# Patient Record
Sex: MALE | Race: WHITE | NOT HISPANIC OR LATINO | URBAN - METROPOLITAN AREA
[De-identification: names, ages, dates, MRNs, and addresses within clinical notes are randomized per-mention and may not be internally consistent; named-entity substitution may affect disease eponyms.]

---

## 2019-09-22 ENCOUNTER — EMERGENCY (EMERGENCY)
Facility: HOSPITAL | Age: 54
LOS: 1 days | Discharge: HOME | End: 2019-09-22
Attending: EMERGENCY MEDICINE | Admitting: EMERGENCY MEDICINE
Payer: COMMERCIAL

## 2019-09-22 VITALS
OXYGEN SATURATION: 97 % | WEIGHT: 229.94 LBS | SYSTOLIC BLOOD PRESSURE: 140 MMHG | HEART RATE: 84 BPM | RESPIRATION RATE: 18 BRPM | DIASTOLIC BLOOD PRESSURE: 82 MMHG | TEMPERATURE: 98 F

## 2019-09-22 VITALS
HEART RATE: 81 BPM | OXYGEN SATURATION: 97 % | DIASTOLIC BLOOD PRESSURE: 81 MMHG | SYSTOLIC BLOOD PRESSURE: 137 MMHG | RESPIRATION RATE: 17 BRPM | TEMPERATURE: 98 F

## 2019-09-22 DIAGNOSIS — Z98.89 OTHER SPECIFIED POSTPROCEDURAL STATES: Chronic | ICD-10-CM

## 2019-09-22 PROCEDURE — 73590 X-RAY EXAM OF LOWER LEG: CPT | Mod: 26,RT

## 2019-09-22 PROCEDURE — 73502 X-RAY EXAM HIP UNI 2-3 VIEWS: CPT

## 2019-09-22 PROCEDURE — 72100 X-RAY EXAM L-S SPINE 2/3 VWS: CPT

## 2019-09-22 PROCEDURE — 99284 EMERGENCY DEPT VISIT MOD MDM: CPT

## 2019-09-22 PROCEDURE — 73610 X-RAY EXAM OF ANKLE: CPT

## 2019-09-22 PROCEDURE — 72100 X-RAY EXAM L-S SPINE 2/3 VWS: CPT | Mod: 26

## 2019-09-22 PROCEDURE — 73502 X-RAY EXAM HIP UNI 2-3 VIEWS: CPT | Mod: 26,RT

## 2019-09-22 PROCEDURE — 73590 X-RAY EXAM OF LOWER LEG: CPT

## 2019-09-22 PROCEDURE — 99284 EMERGENCY DEPT VISIT MOD MDM: CPT | Mod: 25

## 2019-09-22 PROCEDURE — 73610 X-RAY EXAM OF ANKLE: CPT | Mod: 26,RT

## 2019-09-22 NOTE — ED PROVIDER NOTE - MUSCULOSKELETAL, MLM
Mild tenderness to palpation right lower paraspinal area. Minimal pain upon external rotation right hip. Mild tenderness to palpation right tibial area, mid shaft and right lateral malleolus with intact N/V RLE.

## 2019-09-22 NOTE — ED PROVIDER NOTE - CARE PROVIDER_API CALL
Angelo Bhat)  Orthopaedic Surgery; Surgery of the Hand  [address] [place] [address]  Phone: [phone]  Fax: [phone]  Follow Up Time:

## 2019-09-22 NOTE — ED PROVIDER NOTE - PATIENT PORTAL LINK FT
You can access the FollowMyHealth Patient Portal offered by [facility] by registering at the following website: [email or web address]. By joining [employer]’s FollowMyHealth portal, you will also be able to view your health information using other applications (apps) compatible with our system.

## 2019-09-22 NOTE — ED PROVIDER NOTE - OBJECTIVE STATEMENT
53 yo white male, stepped into hole by accident that was covered by grass, fell into this deep hole and developed right lower back pain, sharp and non radiating with additional lower right leg and ankle pain. Pains all were increased with movement. No Bowel/Bladder compliant. No neck/chest or abdominal pains. Able to ambulate w/o assistance.

## 2019-09-22 NOTE — ED PROVIDER NOTE - CARE PLAN
Principal Discharge DX:	Lumbar contusion, initial encounter  Secondary Diagnosis:	Contusion of right lower leg, initial encounter

## 2019-09-22 NOTE — ED PROVIDER NOTE - CLINICAL SUMMARY MEDICAL DECISION MAKING FREE TEXT BOX
Low back and leg pain post fall into hole at [facility] requiring evaluation and x-rays.

## 2019-09-22 NOTE — ED ADULT NURSE NOTE - OBJECTIVE STATEMENT
received pt in bed in #t2 Pt A&O fell into 4ft hole Denies LOC c/o bilat hand pain, lower back pain & c/o right leg burning & right foot n/t.

## 2020-03-24 ENCOUNTER — EMERGENCY (EMERGENCY)
Facility: HOSPITAL | Age: 55
LOS: 1 days | Discharge: HOME | End: 2020-03-24
Attending: STUDENT IN AN ORGANIZED HEALTH CARE EDUCATION/TRAINING PROGRAM | Admitting: STUDENT IN AN ORGANIZED HEALTH CARE EDUCATION/TRAINING PROGRAM
Payer: COMMERCIAL

## 2020-03-24 VITALS
RESPIRATION RATE: 20 BRPM | OXYGEN SATURATION: 95 % | DIASTOLIC BLOOD PRESSURE: 85 MMHG | TEMPERATURE: 100 F | HEART RATE: 124 BPM | SYSTOLIC BLOOD PRESSURE: 150 MMHG

## 2020-03-24 DIAGNOSIS — Z98.89 OTHER SPECIFIED POSTPROCEDURAL STATES: Chronic | ICD-10-CM

## 2020-03-24 PROCEDURE — 93010 ELECTROCARDIOGRAM REPORT: CPT

## 2020-03-24 PROCEDURE — 99284 EMERGENCY DEPT VISIT MOD MDM: CPT

## 2020-03-24 RX ORDER — ACETAMINOPHEN 500 MG
650 TABLET ORAL ONCE
Refills: 0 | Status: COMPLETED | OUTPATIENT
Start: 2020-03-24 | End: 2020-03-24

## 2020-03-24 RX ORDER — SODIUM CHLORIDE 9 MG/ML
1000 INJECTION INTRAMUSCULAR; INTRAVENOUS; SUBCUTANEOUS ONCE
Refills: 0 | Status: COMPLETED | OUTPATIENT
Start: 2020-03-24 | End: 2020-03-24

## 2020-03-24 NOTE — ED ADULT NURSE NOTE - OBJECTIVE STATEMENT
Pt presents to ED with SOB. Pt states he was tested yesterday for COVID pending results,  has PNA on antibiotics, and SOB is worse at night. He states he has body aches, chills when having a fever highest 102.9, dry cough, headache. He vomited 2 days ago and had diarrhea.

## 2020-03-25 VITALS
SYSTOLIC BLOOD PRESSURE: 117 MMHG | TEMPERATURE: 98 F | OXYGEN SATURATION: 100 % | DIASTOLIC BLOOD PRESSURE: 75 MMHG | RESPIRATION RATE: 18 BRPM | HEART RATE: 99 BPM

## 2020-03-25 LAB
ALBUMIN SERPL ELPH-MCNC: 3.2 G/DL — LOW (ref 3.3–5)
ALP SERPL-CCNC: 94 U/L — SIGNIFICANT CHANGE UP (ref 40–120)
ALT FLD-CCNC: 54 U/L — SIGNIFICANT CHANGE UP (ref 12–78)
ANION GAP SERPL CALC-SCNC: 9 MMOL/L — SIGNIFICANT CHANGE UP (ref 5–17)
AST SERPL-CCNC: 36 U/L — SIGNIFICANT CHANGE UP (ref 15–37)
BASOPHILS # BLD AUTO: 0.01 K/UL — SIGNIFICANT CHANGE UP (ref 0–0.2)
BASOPHILS NFR BLD AUTO: 0.2 % — SIGNIFICANT CHANGE UP (ref 0–2)
BILIRUB SERPL-MCNC: 0.4 MG/DL — SIGNIFICANT CHANGE UP (ref 0.2–1.2)
BUN SERPL-MCNC: 10 MG/DL — SIGNIFICANT CHANGE UP (ref 7–23)
CALCIUM SERPL-MCNC: 8.9 MG/DL — SIGNIFICANT CHANGE UP (ref 8.5–10.1)
CHLORIDE SERPL-SCNC: 99 MMOL/L — SIGNIFICANT CHANGE UP (ref 96–108)
CO2 SERPL-SCNC: 28 MMOL/L — SIGNIFICANT CHANGE UP (ref 22–31)
CREAT SERPL-MCNC: 1.1 MG/DL — SIGNIFICANT CHANGE UP (ref 0.5–1.3)
EOSINOPHIL # BLD AUTO: 0 K/UL — SIGNIFICANT CHANGE UP (ref 0–0.5)
EOSINOPHIL NFR BLD AUTO: 0 % — SIGNIFICANT CHANGE UP (ref 0–6)
GLUCOSE SERPL-MCNC: 163 MG/DL — HIGH (ref 70–99)
HCT VFR BLD CALC: 40.7 % — SIGNIFICANT CHANGE UP (ref 39–50)
HGB BLD-MCNC: 13.5 G/DL — SIGNIFICANT CHANGE UP (ref 13–17)
IMM GRANULOCYTES NFR BLD AUTO: 0.6 % — SIGNIFICANT CHANGE UP (ref 0–1.5)
LACTATE SERPL-SCNC: 1.3 MMOL/L — SIGNIFICANT CHANGE UP (ref 0.7–2)
LYMPHOCYTES # BLD AUTO: 1.07 K/UL — SIGNIFICANT CHANGE UP (ref 1–3.3)
LYMPHOCYTES # BLD AUTO: 20 % — SIGNIFICANT CHANGE UP (ref 13–44)
MCHC RBC-ENTMCNC: 28.7 PG — SIGNIFICANT CHANGE UP (ref 27–34)
MCHC RBC-ENTMCNC: 33.2 GM/DL — SIGNIFICANT CHANGE UP (ref 32–36)
MCV RBC AUTO: 86.4 FL — SIGNIFICANT CHANGE UP (ref 80–100)
MONOCYTES # BLD AUTO: 0.33 K/UL — SIGNIFICANT CHANGE UP (ref 0–0.9)
MONOCYTES NFR BLD AUTO: 6.2 % — SIGNIFICANT CHANGE UP (ref 2–14)
NEUTROPHILS # BLD AUTO: 3.9 K/UL — SIGNIFICANT CHANGE UP (ref 1.8–7.4)
NEUTROPHILS NFR BLD AUTO: 73 % — SIGNIFICANT CHANGE UP (ref 43–77)
NRBC # BLD: 0 /100 WBCS — SIGNIFICANT CHANGE UP (ref 0–0)
PLATELET # BLD AUTO: 287 K/UL — SIGNIFICANT CHANGE UP (ref 150–400)
POTASSIUM SERPL-MCNC: 3.8 MMOL/L — SIGNIFICANT CHANGE UP (ref 3.5–5.3)
POTASSIUM SERPL-SCNC: 3.8 MMOL/L — SIGNIFICANT CHANGE UP (ref 3.5–5.3)
PROT SERPL-MCNC: 7.7 G/DL — SIGNIFICANT CHANGE UP (ref 6–8.3)
RBC # BLD: 4.71 M/UL — SIGNIFICANT CHANGE UP (ref 4.2–5.8)
RBC # FLD: 12.1 % — SIGNIFICANT CHANGE UP (ref 10.3–14.5)
SODIUM SERPL-SCNC: 136 MMOL/L — SIGNIFICANT CHANGE UP (ref 135–145)
WBC # BLD: 5.34 K/UL — SIGNIFICANT CHANGE UP (ref 3.8–10.5)
WBC # FLD AUTO: 5.34 K/UL — SIGNIFICANT CHANGE UP (ref 3.8–10.5)

## 2020-03-25 PROCEDURE — 99284 EMERGENCY DEPT VISIT MOD MDM: CPT | Mod: 25

## 2020-03-25 PROCEDURE — 83605 ASSAY OF LACTIC ACID: CPT

## 2020-03-25 PROCEDURE — 36415 COLL VENOUS BLD VENIPUNCTURE: CPT

## 2020-03-25 PROCEDURE — 96365 THER/PROPH/DIAG IV INF INIT: CPT

## 2020-03-25 PROCEDURE — 96361 HYDRATE IV INFUSION ADD-ON: CPT

## 2020-03-25 PROCEDURE — 85027 COMPLETE CBC AUTOMATED: CPT

## 2020-03-25 PROCEDURE — 93005 ELECTROCARDIOGRAM TRACING: CPT

## 2020-03-25 PROCEDURE — 71046 X-RAY EXAM CHEST 2 VIEWS: CPT

## 2020-03-25 PROCEDURE — 71046 X-RAY EXAM CHEST 2 VIEWS: CPT | Mod: 26

## 2020-03-25 PROCEDURE — 80053 COMPREHEN METABOLIC PANEL: CPT

## 2020-03-25 PROCEDURE — 87040 BLOOD CULTURE FOR BACTERIA: CPT

## 2020-03-25 RX ADMIN — Medication 200 MILLIGRAM(S): at 00:58

## 2020-03-25 RX ADMIN — SODIUM CHLORIDE 1000 MILLILITER(S): 9 INJECTION INTRAMUSCULAR; INTRAVENOUS; SUBCUTANEOUS at 01:41

## 2020-03-25 RX ADMIN — Medication 650 MILLIGRAM(S): at 00:41

## 2020-03-25 RX ADMIN — SODIUM CHLORIDE 1000 MILLILITER(S): 9 INJECTION INTRAMUSCULAR; INTRAVENOUS; SUBCUTANEOUS at 00:41

## 2020-03-25 RX ADMIN — Medication 650 MILLIGRAM(S): at 01:15

## 2020-03-25 NOTE — ED PROVIDER NOTE - NS_EDPROVIDERDISPOUSERTYPE_ED_A_ED
Attending Attestation (For Attendings USE Only)...
soft/no distention/nontender/bowel sounds normal/no masses palpable

## 2020-03-25 NOTE — ED PROVIDER NOTE - CARE PROVIDER_API CALL
Johnathan Tavera (DO)  Family Medicine  [address] [place] [address]  Phone: [phone]  Fax: [phone]  Follow Up Time:

## 2020-03-25 NOTE — ED PROVIDER NOTE - PATIENT PORTAL LINK FT
You can access the FollowMyHealth Patient Portal offered by [facility] by registering at the following website: [email or web address]. By joining [employer]’s FollowMyHealth portal, you will also be able to view your health information using other applications (apps) compatible with our system.

## 2020-03-25 NOTE — ED PROVIDER NOTE - NSFOLLOWUPINSTRUCTIONS_ED_ALL_ED_FT
Please be sure to follow up with your primary care doctor and take the medication as prescribed.  Return to the ER for persistent fever, shortness of breath, respiratory distress, or any other concerns. Be sure to self-isolate for 14 days.

## 2020-03-25 NOTE — ED PROVIDER NOTE - CLINICAL SUMMARY MEDICAL DECISION MAKING FREE TEXT BOX
54 year old male with cough, fever, and malaise x 1 week, sent at  yesterday- dx with PNA and had COVID test sent.  Patient looks comfortable, no respiratory distress.  Labs, cultures, lactate, CXR, treat for PNA.  Likely discharge.

## 2020-03-25 NOTE — ED PROVIDER NOTE - PROGRESS NOTE DETAILS
Results of work up explained to patient and copies of all reports given.  Will continue treatment for PNA that was started yesterday at .  COVID test pending.  Advised to self-isolate for 14 days from his family and understands to return to the ER for any concerns, persistent symptoms, respiratory distress.  He is requesting cough medication, states it was not sent to pharmacy from  yesterday.

## 2020-03-25 NOTE — ED PROVIDER NOTE - OBJECTIVE STATEMENT
54 year old male with a history of HLD, DM, HTN presents with fever x 1 week. Tmax 102.9, he has been treating with Tylenol 500mg Q 5 hours. The symptoms initially started with generalized weakness and malaise.   He developed a cough yesterday and went to Urgent Care.  He had a CXR and was diagnosed with bronchitis.  Discharged with Levaquin 500mg. Bromfed DM, and Albuterol.  He has been taking the medication for 2 days without relief.  The fever subsided last night which was when he took his last dose of Tylenol. 54 year old male with a history of HLD, DM, HTN presents with fever x 1 week. Tmax 102.9, he has been treating with Tylenol 500mg Q 5 hours. The symptoms initially started with generalized weakness and malaise.   He developed a dry cough yesterday and went to Urgent Care.  He had a CXR and was diagnosed with bronchitis.  Discharged with Levaquin 500mg. Bromfed DM, and Albuterol.  He has been taking the medication for 2 days without relief.  He returned to urgent care again yesterday because he noted persistent fevers despite taking Tylenol.   He had a COVID and flu test at , flu was negative and he was told COVID will be resulted in 2 days.  No sick contacts, no recent foreign travel.  The fever subsided last night which was when he took his last dose of Tylenol.  Tonight he felt short of breath prior to sleeping so he came to the ED.  He has sleep apnea.  Denies chest pain, headache.   PMD Dr. Tavera 54 year old male with a history of HLD, DM, HTN presents with fever x 1 week. Tmax 102.9, he has been treating with Tylenol 500mg Q 5 hours. The symptoms initially started with generalized weakness and malaise.   He developed a dry cough yesterday and went to Urgent Care.  He had a CXR and was diagnosed with bronchitis/PNA.  Discharged with Levaquin 500mg. Bromfed DM, and Albuterol.  He has been taking the medication for 2 days without relief.  He returned to urgent care again yesterday because he noted persistent fevers despite taking Tylenol.   He had a COVID and flu test at , flu was negative and he was told COVID will be resulted in 2 days.  No sick contacts, no recent foreign travel.  The fever subsided last night which was when he took his last dose of Tylenol.  Tonight he felt short of breath prior to sleeping so he came to the ED.  He has sleep apnea.  Denies chest pain, headache.   PMD Dr. Tavera

## 2020-03-30 LAB
CULTURE RESULTS: SIGNIFICANT CHANGE UP
CULTURE RESULTS: SIGNIFICANT CHANGE UP
SPECIMEN SOURCE: SIGNIFICANT CHANGE UP
SPECIMEN SOURCE: SIGNIFICANT CHANGE UP

## 2020-09-12 ENCOUNTER — TRANSCRIPTION ENCOUNTER (OUTPATIENT)
Age: 55
End: 2020-09-12

## 2020-11-02 NOTE — ED ADULT NURSE NOTE - NSSEPSISSUSPECTED_ED_A_ED
Letter mailed to pt with results.    Karla Oh repeat colonoscopy in 10 year(s).  Checklist:  Surgical history updated.  Colonoscopy tracking completed.  Episode resolved.   Reminder entered.        No

## 2021-03-24 NOTE — ED ADULT NURSE NOTE - PMH
Patient is aware and has not tried anything OTC. She said she will try other avenues.    Diabetes    Hyperlipidemia    Kidney stone  left

## 2021-06-16 ENCOUNTER — APPOINTMENT (OUTPATIENT)
Dept: ELECTROPHYSIOLOGY | Facility: CLINIC | Age: 56
End: 2021-06-16
Payer: COMMERCIAL

## 2021-06-16 ENCOUNTER — TRANSCRIPTION ENCOUNTER (OUTPATIENT)
Age: 56
End: 2021-06-16

## 2021-06-16 VITALS
WEIGHT: 222 LBS | DIASTOLIC BLOOD PRESSURE: 76 MMHG | SYSTOLIC BLOOD PRESSURE: 138 MMHG | HEIGHT: 68 IN | HEART RATE: 73 BPM | BODY MASS INDEX: 33.65 KG/M2

## 2021-06-16 DIAGNOSIS — I48.91 UNSPECIFIED ATRIAL FIBRILLATION: ICD-10-CM

## 2021-06-16 PROCEDURE — 99244 OFF/OP CNSLTJ NEW/EST MOD 40: CPT

## 2021-06-16 PROCEDURE — 93000 ELECTROCARDIOGRAM COMPLETE: CPT

## 2021-06-20 ENCOUNTER — FORM ENCOUNTER (OUTPATIENT)
Age: 56
End: 2021-06-20

## 2021-06-21 ENCOUNTER — TRANSCRIPTION ENCOUNTER (OUTPATIENT)
Age: 56
End: 2021-06-21

## 2021-06-21 ENCOUNTER — OUTPATIENT (OUTPATIENT)
Dept: OUTPATIENT SERVICES | Facility: HOSPITAL | Age: 56
LOS: 1 days | End: 2021-06-21
Payer: COMMERCIAL

## 2021-06-21 VITALS
WEIGHT: 222.01 LBS | OXYGEN SATURATION: 100 % | TEMPERATURE: 98 F | RESPIRATION RATE: 18 BRPM | SYSTOLIC BLOOD PRESSURE: 120 MMHG | HEART RATE: 77 BPM | HEIGHT: 68 IN | DIASTOLIC BLOOD PRESSURE: 71 MMHG

## 2021-06-21 DIAGNOSIS — I48.91 UNSPECIFIED ATRIAL FIBRILLATION: ICD-10-CM

## 2021-06-21 DIAGNOSIS — Z98.89 OTHER SPECIFIED POSTPROCEDURAL STATES: Chronic | ICD-10-CM

## 2021-06-21 LAB
ANION GAP SERPL CALC-SCNC: 11 MMOL/L — SIGNIFICANT CHANGE UP (ref 5–17)
BUN SERPL-MCNC: 16.6 MG/DL — SIGNIFICANT CHANGE UP (ref 8–20)
CALCIUM SERPL-MCNC: 9.3 MG/DL — SIGNIFICANT CHANGE UP (ref 8.6–10.2)
CHLORIDE SERPL-SCNC: 101 MMOL/L — SIGNIFICANT CHANGE UP (ref 98–107)
CO2 SERPL-SCNC: 27 MMOL/L — SIGNIFICANT CHANGE UP (ref 22–29)
CREAT SERPL-MCNC: 0.98 MG/DL — SIGNIFICANT CHANGE UP (ref 0.5–1.3)
GLUCOSE SERPL-MCNC: 107 MG/DL — HIGH (ref 70–99)
HCT VFR BLD CALC: 47.7 % — SIGNIFICANT CHANGE UP (ref 39–50)
HGB BLD-MCNC: 15.1 G/DL — SIGNIFICANT CHANGE UP (ref 13–17)
MAGNESIUM SERPL-MCNC: 1.8 MG/DL — SIGNIFICANT CHANGE UP (ref 1.6–2.6)
MCHC RBC-ENTMCNC: 29.4 PG — SIGNIFICANT CHANGE UP (ref 27–34)
MCHC RBC-ENTMCNC: 31.7 GM/DL — LOW (ref 32–36)
MCV RBC AUTO: 92.8 FL — SIGNIFICANT CHANGE UP (ref 80–100)
PLATELET # BLD AUTO: 370 K/UL — SIGNIFICANT CHANGE UP (ref 150–400)
POTASSIUM SERPL-MCNC: 4.3 MMOL/L — SIGNIFICANT CHANGE UP (ref 3.5–5.3)
POTASSIUM SERPL-SCNC: 4.3 MMOL/L — SIGNIFICANT CHANGE UP (ref 3.5–5.3)
RBC # BLD: 5.14 M/UL — SIGNIFICANT CHANGE UP (ref 4.2–5.8)
RBC # FLD: 12.8 % — SIGNIFICANT CHANGE UP (ref 10.3–14.5)
SODIUM SERPL-SCNC: 139 MMOL/L — SIGNIFICANT CHANGE UP (ref 135–145)
WBC # BLD: 9.76 K/UL — SIGNIFICANT CHANGE UP (ref 3.8–10.5)
WBC # FLD AUTO: 9.76 K/UL — SIGNIFICANT CHANGE UP (ref 3.8–10.5)

## 2021-06-21 PROCEDURE — 93325 DOPPLER ECHO COLOR FLOW MAPG: CPT

## 2021-06-21 PROCEDURE — 93312 ECHO TRANSESOPHAGEAL: CPT | Mod: 26

## 2021-06-21 PROCEDURE — 83735 ASSAY OF MAGNESIUM: CPT

## 2021-06-21 PROCEDURE — 93312 ECHO TRANSESOPHAGEAL: CPT

## 2021-06-21 PROCEDURE — 36415 COLL VENOUS BLD VENIPUNCTURE: CPT

## 2021-06-21 PROCEDURE — 93320 DOPPLER ECHO COMPLETE: CPT

## 2021-06-21 PROCEDURE — 80048 BASIC METABOLIC PNL TOTAL CA: CPT

## 2021-06-21 PROCEDURE — 93005 ELECTROCARDIOGRAM TRACING: CPT

## 2021-06-21 PROCEDURE — 93325 DOPPLER ECHO COLOR FLOW MAPG: CPT | Mod: 26

## 2021-06-21 PROCEDURE — 93010 ELECTROCARDIOGRAM REPORT: CPT | Mod: 76

## 2021-06-21 PROCEDURE — 92960 CARDIOVERSION ELECTRIC EXT: CPT

## 2021-06-21 PROCEDURE — 93320 DOPPLER ECHO COMPLETE: CPT | Mod: 26

## 2021-06-21 PROCEDURE — 85027 COMPLETE CBC AUTOMATED: CPT

## 2021-06-21 RX ORDER — RIVAROXABAN 15 MG-20MG
1 KIT ORAL
Qty: 0 | Refills: 0 | DISCHARGE

## 2021-06-21 RX ORDER — DILTIAZEM HCL 120 MG
1 CAPSULE, EXT RELEASE 24 HR ORAL
Qty: 30 | Refills: 2
Start: 2021-06-21 | End: 2021-09-18

## 2021-06-21 RX ORDER — MULTIVIT-MIN/FERROUS GLUCONATE 9 MG/15 ML
1 LIQUID (ML) ORAL
Qty: 0 | Refills: 0 | DISCHARGE

## 2021-06-21 RX ORDER — RIVAROXABAN 15 MG-20MG
1 KIT ORAL
Qty: 30 | Refills: 2
Start: 2021-06-21 | End: 2021-09-18

## 2021-06-21 RX ORDER — DIGOXIN 250 MCG
1 TABLET ORAL
Qty: 0 | Refills: 0 | DISCHARGE

## 2021-06-21 RX ORDER — HALOBETASOL PROPIONATE 0.5 MG/G
1 OINTMENT TOPICAL
Qty: 0 | Refills: 0 | DISCHARGE

## 2021-06-21 RX ORDER — VALACYCLOVIR 500 MG/1
0 TABLET, FILM COATED ORAL
Qty: 0 | Refills: 0 | DISCHARGE

## 2021-06-21 RX ORDER — DILTIAZEM HCL 120 MG
1 CAPSULE, EXT RELEASE 24 HR ORAL
Qty: 0 | Refills: 0 | DISCHARGE

## 2021-06-21 RX ORDER — ERGOCALCIFEROL 1.25 MG/1
0 CAPSULE ORAL
Qty: 0 | Refills: 0 | DISCHARGE

## 2021-06-21 RX ORDER — CLINDAMYCIN PHOSPHATE GEL USP, 1% 10 MG/G
1 GEL TOPICAL
Qty: 0 | Refills: 0 | DISCHARGE

## 2021-06-21 RX ORDER — MAGNESIUM SULFATE 500 MG/ML
2 VIAL (ML) INJECTION ONCE
Refills: 0 | Status: COMPLETED | OUTPATIENT
Start: 2021-06-21 | End: 2021-06-21

## 2021-06-21 RX ORDER — EMPAGLIFLOZIN 10 MG/1
1 TABLET, FILM COATED ORAL
Qty: 0 | Refills: 0 | DISCHARGE

## 2021-06-21 RX ORDER — CICLOPIROX OLAMINE 7.7 MG/G
1 CREAM TOPICAL
Qty: 0 | Refills: 0 | DISCHARGE

## 2021-06-21 RX ADMIN — Medication 50 GRAM(S): at 13:08

## 2021-06-21 NOTE — DISCHARGE NOTE PROVIDER - CARE PROVIDERS DIRECT ADDRESSES
[email or web address]

## 2021-06-21 NOTE — H&P PST ADULT - COMMENTS
denies recent cough, fever, chills, dysuria, hematuria, recent travel or COVID 19 infection  COVID Vaccine denies recent cough, fever, chills, dysuria, hematuria, recent travel or COVID 19 infection  COVID Vaccine: Pfizer completed 3/2021

## 2021-06-21 NOTE — DISCHARGE NOTE PROVIDER - NSDCMRMEDTOKEN_GEN_ALL_CORE_FT
atorvastatin 20 mg oral tablet: 1 tab(s) orally once a day (at bedtime)  Centrum Silver Men&#x27;s oral tablet: 1 tab(s) orally once a day  clindamycin 1% topical gel: Apply topically to affected area 2 times a day  DilTIAZem (Eqv-Cardizem CD) 240 mg/24 hours oral capsule, extended release: 1 cap(s) orally once a day  ergocalciferol 50,000 intl units (1.25 mg) oral tablet: orally once a week  halobetasol 0.05% topical ointment: Apply topically to affected area 2 times a day  Jardiance 10 mg oral tablet: 1 tab(s) orally once a day (in the morning)  Loprox 0.77% topical cream: Apply topically to affected area 2 times a day  metFORMIN 1000 mg oral tablet: 1 tab(s) orally 2 times a day  rivaroxaban 20 mg oral tablet: 1 tab(s) orally once a day (in the evening)  valACYclovir 1 g oral tablet: orally once a day (at bedtime)

## 2021-06-21 NOTE — DISCHARGE NOTE PROVIDER - HOSPITAL COURSE
55 obese male with pmhx DM, HLD, LBBB, cervical spinal herniations and newly diagnosed (5/30/21), symptomatic, persistent atrial fibrillation who presented today for and is now status post KEN negative for CARISSA thrombus and uncomplicated DCCV with restoration of sinus rhythm.

## 2021-06-21 NOTE — PROGRESS NOTE ADULT - SUBJECTIVE AND OBJECTIVE BOX
Pt doing well s/p uncomplicated KEN & DCCV 300J x 1, currently in SR. Denies complaint.   Prelim KEN pre Dr Ryan: nml LVFx, negative for intracardiac thrombus    ECG:     Exam:   VSS, NAD, A&O x 3  Skin: no erythema/edema/blistering at defib pad sites.   Card: S1/S2, RRR, no m/g/r  Resp: lungs CTA b/l  Abd: S/NT/ND  Ext: no edema, distal pulses intact    Assessment: 55 obese male with pmhx DM, HLD, LBBB, cervical spinal herniations and newly diagnosed (5/30/21), symptomatic, persistent atrial fibrillation who presented today for and is now status post KEN negative for CARISSA thrombus and uncomplicated DCCV with restoration of sinus rhythm.     Plan:   Observation on telemetry per post op protocol.    Resume PO intake.   Ambulate w/ assist once fully awake & back to baseline mental status w/ VSS.  Continue Xarelto 20mg nightly. Importance of strict compliance with anticoagulation regimen reinforced with pt.   Discontinue digoxin  Resume other home medications.   Anticipate d/c home once all criteria met, with outpt f/up in 1 month, sooner if needed.   Pt doing well s/p uncomplicated KEN & DCCV 300J x 1, currently in SR. Denies complaint.   Prelim KEN pre Dr Ryan: nml LVFx, negative for intracardiac thrombus    ECG: sinus rhythm 81bpm, LAD, LBBB    Exam:   VSS, NAD, A&O x 3  Skin: no erythema/edema/blistering at defib pad sites.   Card: S1/S2, RRR, no m/g/r  Resp: lungs CTA b/l  Abd: S/NT/ND  Ext: no edema, distal pulses intact    Assessment: 55 obese male with pmhx DM, HLD, LBBB, cervical spinal herniations and newly diagnosed (5/30/21), symptomatic, persistent atrial fibrillation who presented today for and is now status post KEN negative for CARISSA thrombus and uncomplicated DCCV with restoration of sinus rhythm.     Plan:   Observation on telemetry per post op protocol.    Resume PO intake.   Ambulate w/ assist once fully awake & back to baseline mental status w/ VSS.  Continue Xarelto 20mg nightly. Importance of strict compliance with anticoagulation regimen reinforced with pt.   Discontinue digoxin  Resume other home medications.   Anticipate d/c home once all criteria met, with outpt f/up in 1 month, sooner if needed.  Weight loss and sleep apnea work up encouraged    Pt doing well s/p uncomplicated KEN & DCCV 300J x 1, currently in SR. Denies complaint.   Prelim KEN pre Dr Ryan: nml LVFx, negative for intracardiac thrombus    ECG: sinus rhythm 81bpm, LAD, LBBB    Exam:   VSS, NAD, A&O x 3  Skin: no erythema/edema/blistering at defib pad sites.   Card: S1/S2, RRR, no m/g/r  Resp: lungs CTA b/l  Abd: S/NT/ND  Ext: no edema, distal pulses intact    Assessment: 55 obese male with pmhx DM, HLD, LBBB, cervical spinal herniations and newly diagnosed (5/30/21), symptomatic, persistent atrial fibrillation who presented today for and is now status post KEN negative for CARISSA thrombus and uncomplicated DCCV with restoration of sinus rhythm.     Plan:   Observation on telemetry per post op protocol.    Resume PO intake.   Ambulate w/ assist once fully awake & back to baseline mental status w/ VSS.  Continue Xarelto 20mg nightly. Importance of strict compliance with anticoagulation regimen reinforced with pt.   Discontinue digoxin  Resume other home medications.   Magnesium supplemented  Anticipate d/c home once all criteria met, with outpt f/up in 1 month, sooner if needed.  Weight loss and sleep apnea work up encouraged

## 2021-06-21 NOTE — H&P PST ADULT - NSICDXPASTMEDICALHX_GEN_ALL_CORE_FT
PAST MEDICAL HISTORY:  Atrial fibrillation     Diabetes     Hyperlipidemia     Kidney stone left    Left bundle branch block (LBBB)

## 2021-06-21 NOTE — H&P PST ADULT - ASSESSMENT
55 male with pmhx DM, HLD, LBBB, cervical spinal herniations and persistent atrial fibrillation who presents for PST, KEN & DCCV.     - IVL, labs, ECG  - confirmed npo > 10 hours  - last dose of xarelto ........... 55 male with pmhx DM, HLD, LBBB, cervical spinal herniations and newly diagnosed (5/30/21), symptomatic, persistent atrial fibrillation who presents for PST, KEN & DCCV.     - IVL, labs, ECG  - confirmed npo > 10 hours  - last dose of xarelto this morning 6/21/21  - pt has referral for sleep study but is not scheduled yet 55 male with pmhx DM, HLD, LBBB, cervical spinal herniations and newly diagnosed (5/30/21), symptomatic, persistent atrial fibrillation who presents for PST, KEN & DCCV.     - IVL, labs, ECG  - confirmed npo > 10 hours  - last dose of xarelto was last night 6/20/21  - pt has referral for sleep study but is not scheduled yet

## 2021-06-21 NOTE — DISCHARGE NOTE PROVIDER - CARE PROVIDER_API CALL
Juan Diego Mcgill)  Cardiology; Internal Medicine  [address], [address]  [place] [address]  Phone: [phone]  Fax: [phone]  Follow Up Time:     Jerry Gonzales)  Cardiovascular Disease; Internal Medicine  [facility], [address], [address]  [place] [address]  Phone: [phone]  Fax: [phone]  Follow Up Time:

## 2021-06-21 NOTE — H&P PST ADULT - HISTORY OF PRESENT ILLNESS
55 male with pmhx DM, HLD, LBBB, cervical spinal herniations and persistent atrial fibrillation who presents for PST, KEN & DCCV.   Newly diagnosed atrial fibrillation                 , pt denies prior DCCV, ablation of AAD.    Stress Test 10/24/2017: EF 73%, negative for ischemia, negative for infarct  TTE 2016: nml LVFx 55 male with pmhx DM, HLD, LBBB, cervical spinal herniations and persistent atrial fibrillation who presents for PST, KEN & DCCV.   Newly diagnosed atrial fibrillation 5/30/21, pt denies prior DCCV, ablation of AAD.  Pt awaiting sleep study.    Stress Test 10/24/2017: EF 73%, negative for ischemia, negative for infarct  TTE 2016: nml LVFx

## 2021-06-21 NOTE — DISCHARGE NOTE NURSING/CASE MANAGEMENT/SOCIAL WORK - PATIENT PORTAL LINK FT
You can access the FollowMyHealth Patient Portal offered by [facility] by registering at the following website: [email or web address]. By joining [employer]’s FollowMyHealth portal, you will also be able to view your health information using other applications (apps) compatible with our system.

## 2021-06-21 NOTE — DISCHARGE NOTE PROVIDER - NSDCCPTREATMENT_GEN_ALL_CORE_FT
PRINCIPAL PROCEDURE  Procedure: Cardioversion, with KEN if indicated  Findings and Treatment: Please schedule a 2-4 week follow up with Dr Mcgill at [facility] [place], sooner if needed.  -Take each dose of your anticoagulation medication (blood thinner) exactly as prescribed.   -Resume your diet with soft foods first.  - Do not drive, operate heavy machinery or make important decisions for 24 hours following the procedure.  - You may resume all other activities the day after the procedure.  Call your doctor if:   -you develop a fever, cough up blood, have any chest pain, palpitations or difficulty breathing. You may take a throat lozenge if you develop a sore throat or try warm salt water gargle.   - you have any questions or concerns regarding the procedure.  If you experience increased difficulty breathing or chest pain, or if you faint, have dizzy spells, or for any other distressing symptom, please seek immediate medical attention.

## 2021-06-22 NOTE — ED ADULT NURSE NOTE - NSFALLRSKASSESSTYPE_ED_ALL_ED
PDMP reviewed; no aberrant behavior identified,  If OK to refill, please sign.   Initial (On Arrival)

## 2021-07-23 ENCOUNTER — TRANSCRIPTION ENCOUNTER (OUTPATIENT)
Age: 56
End: 2021-07-23

## 2021-07-26 NOTE — CARDIOLOGY SUMMARY
[de-identified] : 6/16/21 atrial fibrillation 73 bpm, LBBB ( ms) [de-identified] : Stress 10/24/17 normal nuclear stress, diaphragmatic attenuation, LVEF 73%  [de-identified] : TTE 2016 normal LV function

## 2021-07-26 NOTE — HISTORY OF PRESENT ILLNESS
[FreeTextEntry1] : 55 year old gentleman with history of DM, HLD, LBBB, cervical spinal herniations, presenting for evaluation of atrial fibrillation.  \par \par Last month he presented with mild chest pain, which occurred the day after drinking vodka and expresso. He went to his PMD and was found to be in AF with rapid rates. He was then hospitalized at [facility] for atrial fibrillation, and treated with rate control and diuresis for associated CHF symptoms. He subsequntly felt better, and was discharged on anticoagulation (Xarelto, and ASA was continued) and rate control with diltiazem 240mg qd and digoxin.   \par \par On followup, he has remained in atrial fibrillation. He feels fatigued, and is more exhausted after mild activity, but otherwise feels generally well. He denies palpitation, syncope, or dyspnea at this time.  \par \par ECG today reveals AF with average rate 73 bpm, and LBBB.  \par \par Prior TTE and stress were normal. Pt reports he had a TTE in the hospital which was also normal.  \par \par Of note, he does snore, and has not had prior sleep evaluation. \par \par   \par \par Current meds include ASA 81, Xarelto 20mg qd, digoxin 250mcg qd, diltiazem 240mg qd \par \par

## 2021-07-26 NOTE — REVIEW OF SYSTEMS
Procedure   [facility]  Cardiology Progress Note    Patient Identification:  Name:Casey Snowden  Age:83 y.o.  Sex: male  :  1933  MRN: [id number]           2017    Subjective:    Chief Complaint   Patient presents with   • Atrial Fibrillation       HPI     Hospital Course 2016: Casey Snowden Is a 83-year-old male with past medical history including DVT, Parkinson's disease, and previous right knee replacement. He began having issues with his right knee about 3-4 weeks ago after a trauma from a fall to the knee. It has had some swelling both of the knee as well as behind the knee. He saw urgent care as well as his vascular surgeon Dr. NAVARRO. Additional issues developed over the past week, especially the past 2 or 3 days. He had fevers, chills increasing drowsiness, and even drainage from the area. He presented to [facility] emergency room x-rays were negative he was started on IV vancomycin. Feels about the same.  The patient was admitted to the hospital with the infected hematoma in the right leg. He also had some paroxysmal atrial fibrillation with a rapid response requiring Cardizem drip. He was seen by orthopedic surgery, cardiology and infectious disease. He had I&D of the infected hematoma which grew methicillin sensitive staph and was placed on multi for his paroxysmal A. fib. After the I&D the patient was restarted on Eliquis and felt well enough to go home. He'll continue with some oral antibiotics for another week and follow-up with his doctors in the next week or 2 as listed for follow-up appointments.    Since the discharge from hospital he is doing good. Remains in sinus rhythm.  He completed his Multaq and remains in sinus rhythm and he wants to stay off Multaq. He is on Eliquis. He denies any chest pain.       Review of Systems   Constitution: Negative for chills, fever and malaise/fatigue.   HENT: Negative for headaches.    Eyes: Negative for blurred  vision and double vision.   Cardiovascular: Positive for leg swelling (better). Negative for chest pain, irregular heartbeat and palpitations.   Respiratory: Positive for snoring. Negative for shortness of breath.    Skin: Negative for color change.   Musculoskeletal: Negative for arthritis and joint pain.   Gastrointestinal: Negative for abdominal pain, nausea and vomiting.   Neurological: Negative for dizziness, light-headedness and numbness.   Psychiatric/Behavioral: Negative for depression.       Past Medical History   Diagnosis Date   • Carotid stenosis    • Chronic deep vein thrombosis (DVT) of popliteal vein of right lower extremity    • DVT of lower extremity (deep venous thrombosis)    • Long-term use of high-risk medication    • Parkinson's disease    • Postphlebitic syndrome        Past Surgical History   Procedure Laterality Date   • Vena cava filter placement     • Total knee arthroplasty Right    • Knee incision and drainage Right 12/27/2016     Procedure: KNEE INCISION AND DRAINAGE;  Surgeon: Triston Whitten MD;  Location: [facility];  Service:        Social History     Social History   • Marital status:      Spouse name: N/A   • Number of children: N/A   • Years of education: college grad     Occupational History   • financial advis      self employed     Social History Main Topics   • Smoking status: Former Smoker     Types: Cigarettes   • Smokeless tobacco: Never Used   • Alcohol use 1.2 oz/week     2 Glasses of wine per week   • Drug use: No   • Sexual activity: Yes     Partners: Female     Other Topics Concern   • Not on file     Social History Narrative       Family History   Problem Relation Age of Onset   • Heart attack Mother    • Stroke Brother    • Heart attack Maternal Aunt    • Heart attack Maternal Uncle    • Deep vein thrombosis Neg Hx        Scheduled Meds:    Current Outpatient Prescriptions:   •  atorvastatin (LIPITOR) 20 MG tablet, TAKE 1 TABLET DAILY, Disp: 90 tablet,  Rfl: 3  •  atropine 1 % ophthalmic solution, Apply 1 drop to eye 3 (Three) Times a Day., Disp: , Rfl:   •  carbidopa-levodopa (SINEMET)  MG per tablet, 1 tablet 3 (Three) Times a Day., Disp: , Rfl:   •  carboxymethylcellulose (REFRESH PLUS) 0.5 % solution, 1 drop 3 (Three) Times a Day As Needed for dry eyes., Disp: , Rfl:   •  ELIQUIS 2.5 MG tablet tablet, 2.5 mg Every 12 (Twelve) Hours., Disp: , Rfl:   •  Multiple Vitamins-Minerals (PRESERVISION AREDS) capsule, Take  by mouth., Disp: , Rfl:     Objective:  Visit Vitals   • /95   • Pulse 50   • Wt 189 lb (85.7 kg)   • BMI 26.36 kg/m2        Physical Exam  Physical Exam:    General: No acute distress.    Skin: Warm and dry, no diaphoresis noted   HEENT: No ptosis; external ear and nose normal; oral mucosa moist   Neck: Supple; no carotid bruits; no JVD, Trachea mid line   Heart: S1S2 regular rate and rhythm; no murmurs; no gallop or rub appreciated, apex not displaced   Chest: Respirations regular, unlabored at rest, bilateral breath sounds have good air entry; no  wheezes auscultated.     Abdomen: Soft, non-tender, non-distended, positive bowel sounds  No hepatosplenomegaly   Extremities: Bilateral lower extremities have no pre-tibial pitting edema; Radials are palpable   Neurological: Alert and oriented x 3; no new motor deficits,           ECG 12 Lead  Date/Time: 1/30/2017 12:07 PM  Performed by: AMARI WHITE  Authorized by: AMARI WHITE   Comparison: compared with previous ECG   Similar to previous ECG  Rhythm: sinus rhythm  Rate: normal  QRS axis: normal               Assessment:  Problem List Items Addressed This Visit        Cardiovascular and Mediastinum    Benign essential hypertension - Primary    Hyperlipidemia    Atrial fibrillation with RVR    Hypertension          Plan:    Overall clinically he has been stable with no angina. His ECG has been stable. He can DC Multaq but continue the Eliquis. He remains in sinus now. No  angina. His BP is high today and I asked him to check his bP and call his PmD to restart his Losartan      01/30/2017  Sudarshan Mercado MD, FACC     [Feeling Fatigued] : feeling fatigued [SOB] : shortness of breath [Dyspnea on exertion] : dyspnea during exertion [Negative] : Integumentary [Chest Discomfort] : no chest discomfort [Lower Ext Edema] : no extremity edema [Palpitations] : no palpitations [Orthopnea] : no orthopnea [Syncope] : no syncope [Dizziness] : no dizziness [Convulsions] : no convulsions [Confusion] : no confusion was observed [Easy Bleeding] : no tendency for easy bleeding [Easy Bruising] : no tendency for easy bruising

## 2021-07-26 NOTE — PHYSICAL EXAM
[Well Developed] : well developed [Well Nourished] : well nourished [No Acute Distress] : no acute distress [Normal Conjunctiva] : normal conjunctiva [Normal Venous Pressure] : normal venous pressure [Clear Lung Fields] : clear lung fields [Good Air Entry] : good air entry [No Respiratory Distress] : no respiratory distress  [Soft] : abdomen soft [Normal Gait] : normal gait [No Edema] : no edema [No Rash] : no rash [Moves all extremities] : moves all extremities [No Focal Deficits] : no focal deficits [Alert and Oriented] : alert and oriented [de-identified] : irregularly irregular, 2/6 FAUSTO

## 2021-07-26 NOTE — DISCUSSION/SUMMARY
[FreeTextEntry1] :  55 year old gentleman with history of DM, HLD, LBBB, cervical spinal herniations, presenting for evaluation of atrial fibrillation. His AF is of relatively recent onset, but has been persistent and symptomatic, and required hospitalization for rate control and diuresis. Despite rate control, he remains symptomatic with fatigue. We discussed AF in detail, associated risks and morbidities, and treatment options. As an initial strategy will plan KEN/DCCV. We did discuss that AF is likely to recur in the future, and we discussed potential long-term strategies for rhythm control including potential AF ablation or antiarrhythmic meds. If/when AF does recur, he is a good candidate for ablation particularly given his young age and active lifestyle.  \par \par -continue Xarelto (CHADSVASc 1). Can stop concomitant ASA \par \par -continue rate control meds for now. Will plan to stop digoxin following DCCV.  \par \par -KEN/DCCV \par \par -sleep study to evaluate for ANAMARIA. Other risk factor/lifestyle modifications discussed, including importance of weight loss, exercise, and risk factor control. \par \par -EP followup after above

## 2021-07-28 PROBLEM — I44.7 LEFT BUNDLE-BRANCH BLOCK, UNSPECIFIED: Chronic | Status: ACTIVE | Noted: 2021-06-21

## 2021-07-28 PROBLEM — I48.91 UNSPECIFIED ATRIAL FIBRILLATION: Chronic | Status: ACTIVE | Noted: 2021-06-21

## 2021-08-04 ENCOUNTER — APPOINTMENT (OUTPATIENT)
Dept: ELECTROPHYSIOLOGY | Facility: CLINIC | Age: 56
End: 2021-08-04
Payer: COMMERCIAL

## 2021-08-04 VITALS
SYSTOLIC BLOOD PRESSURE: 126 MMHG | HEIGHT: 68 IN | WEIGHT: 224 LBS | BODY MASS INDEX: 33.95 KG/M2 | HEART RATE: 77 BPM | DIASTOLIC BLOOD PRESSURE: 80 MMHG

## 2021-08-04 PROCEDURE — 99215 OFFICE O/P EST HI 40 MIN: CPT

## 2021-08-04 PROCEDURE — 93000 ELECTROCARDIOGRAM COMPLETE: CPT

## 2021-08-04 NOTE — HISTORY OF PRESENT ILLNESS
[FreeTextEntry1] : 55 year old gentleman with history of DM, HLD, LBBB, cervical spinal herniations, and recent onset symptomatic persistent atrial fibrillation s/p KEN/DCCV on 6/21/21, presenting for follow-up.  \par \par In May 2021 he presented with mild chest pain, which occurred the day after drinking vodka and expresso. He went to his PMD and was found to be in AF with rapid rates. He was then hospitalized at [facility] for atrial fibrillation, and treated with rate control and diuresis for associated CHF symptoms. He subsequently felt better, and was discharged on anticoagulation (Xarelto, and ASA was continued) and rate control with diltiazem 240mg qd and digoxin.  On followup, he has remained in atrial fibrillation, with associated fatigued. \par \par He was noted to have LBBB as well, and preserved LV function. \par \par On 6/21/21 he underwent DCCV. He was continued on Xarelto (ASA was stopped) and diltiazem (but digoxin was stopped).  \par \par On follow-up he is feeling well, and has had symptomatic improvement since DCCV. He denies recurrent palpitation, dyspnea, or lightheadedness/syncope. \par \par ECG today reveals sinus rhythm 77 bpm and LBBB.  \par \par He denies bleeding, but is concerned about the cost of anticoagulation and very much wants to stop taking this and avoid long-term medications if possible.  \par \par  He has been using the [employer] Pro mobile giles and checking his pulse daily, and has not noted any irregularities.  \par \par Of note, he does snore, and has not had prior sleep evaluation. \par \par Current meds include ASA 81, Xarelto 20mg qd, digoxin 250mcg qd, diltiazem 240mg qd \par \par

## 2021-08-04 NOTE — DISCUSSION/SUMMARY
[FreeTextEntry1] : 55 year old gentleman with history of DM, HLD, LBBB, cervical spinal herniations, and recent onset symptomatic persistent atrial fibrillation s/p KEN/DCCV on 6/21/21, presenting for follow-up. He has maintained sinus rhythm since the DCCV, and has been feeling much better. He does believe his AF event was triggered by alcohol/caffeine intake, and we discussed that its unclear if he will have recurrent AF at this point. He is at intermediate risk for thromboembolism in AF, with CHADSVASc= 1-2 (DM, and likely HTN), and we discussed the risks and benefits of continued anticoagulation at this point. He strongly desires to avoid anticoagulation if possible, particularly if the AF was an isolated incident. If recurrent AF is noted, I would recommend long-term anticoagulation. For now will plan surveillance monitoring, transition off Xarelto back on ASA 81mg qd, and restart OAC if further AF noted.  \par \par -stop Xarelto,  restart ASA 81mg qd \par \par -can stop diltiazem \par \par -2 week MCOT monitor for arrhythmia surveillance. We did discuss potential ILR implant in the future for further monitoring as well \par \par -pt to continue self-monitoring with [employer] pro \par \par -EP follow-up in 6-12 mo or prn if recurrent arrhythmia/symptoms noted

## 2021-08-04 NOTE — CARDIOLOGY SUMMARY
[de-identified] : 8/4/21 sinus rhythm 77 bpm, LBBB (QRS 160ms)\par 6/16/21 atrial fibrillation 73 bpm, LBBB ( ms) [de-identified] : Stress 10/24/17 normal nuclear stress, diaphragmatic attenuation, LVEF 73%  [de-identified] : KEN 6/21/21- LVEF 50-55%, nml biventricular size and function, mild-mod TR, trace MR, no LA thrombus. \par \par TTE 2016 normal LV function

## 2021-08-04 NOTE — REVIEW OF SYSTEMS
[Feeling Fatigued] : not feeling fatigued [SOB] : no shortness of breath [Dyspnea on exertion] : not dyspnea during exertion [Chest Discomfort] : no chest discomfort [Lower Ext Edema] : no extremity edema [Palpitations] : no palpitations [Orthopnea] : no orthopnea [Syncope] : no syncope [Dizziness] : no dizziness [Convulsions] : no convulsions [Confusion] : no confusion was observed [Easy Bleeding] : no tendency for easy bleeding [Easy Bruising] : no tendency for easy bruising [Negative] : Integumentary

## 2023-05-10 NOTE — ED PROVIDER NOTE - PMH
Addended by: Rahul Soto on: 5/10/2023 05:48 PM     Modules accepted: Orders
Diabetes    Hyperlipidemia    Kidney stone  left

## 2025-02-13 NOTE — ED ADULT NURSE NOTE - CHIEF COMPLAINT
Patient is scheduled for an appointment. Courtesy refill provided.    
The patient is a 54y Male complaining of fall.

## 2025-04-01 ENCOUNTER — EMERGENCY (EMERGENCY)
Facility: HOSPITAL | Age: 60
LOS: 1 days | Discharge: HOME | End: 2025-04-01
Attending: EMERGENCY MEDICINE | Admitting: EMERGENCY MEDICINE
Payer: COMMERCIAL

## 2025-04-01 VITALS
DIASTOLIC BLOOD PRESSURE: 80 MMHG | OXYGEN SATURATION: 98 % | RESPIRATION RATE: 18 BRPM | SYSTOLIC BLOOD PRESSURE: 130 MMHG | HEART RATE: 92 BPM | TEMPERATURE: 98 F | HEIGHT: 68 IN | WEIGHT: 218.04 LBS

## 2025-04-01 DIAGNOSIS — Z98.89 OTHER SPECIFIED POSTPROCEDURAL STATES: Chronic | ICD-10-CM

## 2025-04-01 LAB
ALBUMIN SERPL ELPH-MCNC: 3.5 G/DL — SIGNIFICANT CHANGE UP (ref 3.3–5)
ALP SERPL-CCNC: 133 U/L — HIGH (ref 40–120)
ALT FLD-CCNC: 29 U/L — SIGNIFICANT CHANGE UP (ref 12–78)
ANION GAP SERPL CALC-SCNC: 6 MMOL/L — SIGNIFICANT CHANGE UP (ref 5–17)
AST SERPL-CCNC: 21 U/L — SIGNIFICANT CHANGE UP (ref 15–37)
BASOPHILS # BLD AUTO: 0.03 K/UL — SIGNIFICANT CHANGE UP (ref 0–0.2)
BASOPHILS NFR BLD AUTO: 0.4 % — SIGNIFICANT CHANGE UP (ref 0–2)
BILIRUB SERPL-MCNC: 0.7 MG/DL — SIGNIFICANT CHANGE UP (ref 0.2–1.2)
BILIRUB UR-MCNC: NEGATIVE — SIGNIFICANT CHANGE UP
BUN SERPL-MCNC: 14 MG/DL — SIGNIFICANT CHANGE UP (ref 7–23)
CALCIUM SERPL-MCNC: 9.1 MG/DL — SIGNIFICANT CHANGE UP (ref 8.5–10.1)
CHLORIDE SERPL-SCNC: 105 MMOL/L — SIGNIFICANT CHANGE UP (ref 96–108)
CO2 SERPL-SCNC: 27 MMOL/L — SIGNIFICANT CHANGE UP (ref 22–31)
COLOR SPEC: YELLOW — SIGNIFICANT CHANGE UP
CREAT SERPL-MCNC: 0.97 MG/DL — SIGNIFICANT CHANGE UP (ref 0.5–1.3)
DIFF PNL FLD: NEGATIVE — SIGNIFICANT CHANGE UP
EGFR: 90 ML/MIN/1.73M2 — SIGNIFICANT CHANGE UP
EGFR: 90 ML/MIN/1.73M2 — SIGNIFICANT CHANGE UP
EOSINOPHIL # BLD AUTO: 0.21 K/UL — SIGNIFICANT CHANGE UP (ref 0–0.5)
EOSINOPHIL NFR BLD AUTO: 2.8 % — SIGNIFICANT CHANGE UP (ref 0–6)
GLUCOSE SERPL-MCNC: 102 MG/DL — HIGH (ref 70–99)
GLUCOSE UR QL: >=1000 MG/DL
HCT VFR BLD CALC: 48.4 % — SIGNIFICANT CHANGE UP (ref 39–50)
HGB BLD-MCNC: 16.5 G/DL — SIGNIFICANT CHANGE UP (ref 13–17)
IMM GRANULOCYTES NFR BLD AUTO: 0.7 % — SIGNIFICANT CHANGE UP (ref 0–0.9)
KETONES UR-MCNC: ABNORMAL MG/DL
LEUKOCYTE ESTERASE UR-ACNC: NEGATIVE — SIGNIFICANT CHANGE UP
LIDOCAIN IGE QN: 37 U/L — SIGNIFICANT CHANGE UP (ref 13–75)
LYMPHOCYTES # BLD AUTO: 1.8 K/UL — SIGNIFICANT CHANGE UP (ref 1–3.3)
LYMPHOCYTES # BLD AUTO: 23.7 % — SIGNIFICANT CHANGE UP (ref 13–44)
MCHC RBC-ENTMCNC: 30.2 PG — SIGNIFICANT CHANGE UP (ref 27–34)
MCV RBC AUTO: 88.5 FL — SIGNIFICANT CHANGE UP (ref 80–100)
MONOCYTES # BLD AUTO: 0.58 K/UL — SIGNIFICANT CHANGE UP (ref 0–0.9)
MONOCYTES NFR BLD AUTO: 7.7 % — SIGNIFICANT CHANGE UP (ref 2–14)
NEUTROPHILS # BLD AUTO: 4.91 K/UL — SIGNIFICANT CHANGE UP (ref 1.8–7.4)
NEUTROPHILS NFR BLD AUTO: 64.7 % — SIGNIFICANT CHANGE UP (ref 43–77)
NITRITE UR-MCNC: NEGATIVE — SIGNIFICANT CHANGE UP
NRBC BLD AUTO-RTO: 0 /100 WBCS — SIGNIFICANT CHANGE UP (ref 0–0)
PH UR: 5.5 — SIGNIFICANT CHANGE UP (ref 5–8)
PLATELET # BLD AUTO: 309 K/UL — SIGNIFICANT CHANGE UP (ref 150–400)
POTASSIUM SERPL-MCNC: 4.4 MMOL/L — SIGNIFICANT CHANGE UP (ref 3.5–5.3)
POTASSIUM SERPL-SCNC: 4.4 MMOL/L — SIGNIFICANT CHANGE UP (ref 3.5–5.3)
PROT SERPL-MCNC: 7.5 G/DL — SIGNIFICANT CHANGE UP (ref 6–8.3)
PROT UR-MCNC: NEGATIVE MG/DL — SIGNIFICANT CHANGE UP
RBC # BLD: 5.47 M/UL — SIGNIFICANT CHANGE UP (ref 4.2–5.8)
RBC # FLD: 12.4 % — SIGNIFICANT CHANGE UP (ref 10.3–14.5)
SODIUM SERPL-SCNC: 138 MMOL/L — SIGNIFICANT CHANGE UP (ref 135–145)
SP GR SPEC: 1.06 — HIGH (ref 1–1.03)
UROBILINOGEN FLD QL: 0.2 MG/DL — SIGNIFICANT CHANGE UP (ref 0.2–1)
WBC # BLD: 7.58 K/UL — SIGNIFICANT CHANGE UP (ref 3.8–10.5)
WBC # FLD AUTO: 7.58 K/UL — SIGNIFICANT CHANGE UP (ref 3.8–10.5)

## 2025-04-01 PROCEDURE — 36415 COLL VENOUS BLD VENIPUNCTURE: CPT

## 2025-04-01 PROCEDURE — 85025 COMPLETE CBC W/AUTO DIFF WBC: CPT

## 2025-04-01 PROCEDURE — 80053 COMPREHEN METABOLIC PANEL: CPT

## 2025-04-01 PROCEDURE — 83690 ASSAY OF LIPASE: CPT

## 2025-04-01 PROCEDURE — 74177 CT ABD & PELVIS W/CONTRAST: CPT | Mod: 26

## 2025-04-01 PROCEDURE — 96374 THER/PROPH/DIAG INJ IV PUSH: CPT | Mod: XU

## 2025-04-01 PROCEDURE — 99285 EMERGENCY DEPT VISIT HI MDM: CPT

## 2025-04-01 PROCEDURE — 81003 URINALYSIS AUTO W/O SCOPE: CPT

## 2025-04-01 PROCEDURE — 99284 EMERGENCY DEPT VISIT MOD MDM: CPT | Mod: 25

## 2025-04-01 PROCEDURE — 74177 CT ABD & PELVIS W/CONTRAST: CPT | Mod: MC

## 2025-04-01 RX ORDER — ACETAMINOPHEN 500 MG/5ML
1000 LIQUID (ML) ORAL ONCE
Refills: 0 | Status: COMPLETED | OUTPATIENT
Start: 2025-04-01 | End: 2025-04-01

## 2025-04-01 RX ORDER — CIPROFLOXACIN HCL 250 MG
500 TABLET ORAL ONCE
Refills: 0 | Status: COMPLETED | OUTPATIENT
Start: 2025-04-01 | End: 2025-04-01

## 2025-04-01 RX ORDER — METRONIDAZOLE 250 MG
1 TABLET ORAL
Qty: 30 | Refills: 0
Start: 2025-04-01 | End: 2025-04-10

## 2025-04-01 RX ORDER — METRONIDAZOLE 250 MG
500 TABLET ORAL ONCE
Refills: 0 | Status: COMPLETED | OUTPATIENT
Start: 2025-04-01 | End: 2025-04-01

## 2025-04-01 RX ORDER — CIPROFLOXACIN HCL 250 MG
1 TABLET ORAL
Qty: 20 | Refills: 0
Start: 2025-04-01 | End: 2025-04-10

## 2025-04-01 RX ADMIN — Medication 1000 MILLILITER(S): at 11:30

## 2025-04-01 RX ADMIN — Medication 500 MILLIGRAM(S): at 14:45

## 2025-04-01 RX ADMIN — Medication 400 MILLIGRAM(S): at 11:30

## 2025-04-01 NOTE — ED PROVIDER NOTE - CARE PROVIDER_API CALL
Ryan Segura.  Gastroenterology  [address] [place] [id number]  Phone: [phone]  Fax: [phone]  Follow Up Time:

## 2025-04-01 NOTE — ED PROVIDER NOTE - PHYSICAL EXAMINATION
Gen: Well appearing in NAD.   Head: atraumatic  Heart: s1/s2, RRR  Lung: CTA b/l,   Abd: soft, +mild LLQ and suprapubic TTP no rebound or guarding, NCVAT  Msk: no pedal edema or calf pain, Pt ambulatory   Neuro: AAO x3, patient moving all extremity equally, no focal neuro deficits noted  Skin: Normal for race. No rashes  Psych: Calm and cooperative Gen: Well appearing in NAD.   Head: atraumatic  Heart: s1/s2, RRR  Lung: CTA b/l,   Abd: soft, +mild LLQ and suprapubic TTP no rebound or guarding, NCVAT  Rectal exam performed at bedside.  Small nonthrombosed nontender external hemorrhoid.  Internal rectal exam unremarkable.  No gross blood  Msk: no pedal edema or calf pain, Pt ambulatory   Neuro: AAO x3, patient moving all extremity equally, no focal neuro deficits noted  Skin: Normal for race. No rashes  Psych: Calm and cooperative

## 2025-04-01 NOTE — ED PROVIDER NOTE - PATIENT PORTAL LINK FT
You can access the FollowMyHealth Patient Portal offered by [facility] by registering at the following website: [email or web address]. By joining [email or web address]’s FollowMyHealth portal, you will also be able to view your health information using other applications (apps) compatible with our system.

## 2025-04-01 NOTE — ED PROVIDER NOTE - OBJECTIVE STATEMENT
59-year-old male past medical history of diabetes, hypertension, high cholesterol, left bundle branch block, cervical spine herniations, A-fib status post cardioversion 2021 presents to the ED with complaints of abdominal cramping and rectal bleeding last night.  Patient reports he had a routine colonoscopy 1 week ago which revealed diverticulosis.  Reports he had a normal bowel movement a day after the colonoscopy and was doing well.  Yesterday he ate a lobster roll, few hours later he had diarrhea which was bloody then the abdominal cramping started.  Reports he has not had any bowel movement since then.  He denies any fever chills, urinary symptoms, vomiting, chest pain, shortness of breath, lightheadedness dizziness or all other complaints. 59-year-old male past medical history of diabetes, hypertension, high cholesterol, left bundle branch block, cervical spine herniations, A-fib status post cardioversion 2021 on ASA 81mg presents to the ED with complaints of abdominal cramping and rectal bleeding last night.  Patient reports he had a routine colonoscopy 1 week ago which revealed diverticulosis.  Reports he had a normal bowel movement a day after the colonoscopy and was doing well.  Yesterday he ate a lobster roll, few hours later he had diarrhea which was bloody then the abdominal cramping started.  Reports he has not had any bowel movement since then.  He denies any fever chills, urinary symptoms, vomiting, chest pain, shortness of breath, lightheadedness dizziness or all other complaints.

## 2025-04-01 NOTE — ED PROVIDER NOTE - ATTENDING APP SHARED VISIT CONTRIBUTION OF CARE
Patient came into the ED from home c/o abdominal pain since yesterday. Patient s/p colonscopy 1 week ago and was dx with diverticulosis. no complications. Patient on ASA. States he ate a lobster roll yesterday and then developed some cramping, had diarrhea today and noted gross blood per rectum after the diarrhea and came to the ED for further evaluation. patient states his cramping has improved after IV tylenol. no fevers. no travel. never occurred previously.     A&Ox3, NAD. LUngs, CTA, equal and b/l, no r/r/w. S1S2, no murmurs, RRR. Abdomen soft, nt/nd. nml bowel sounds. +PMSx4.

## 2025-04-01 NOTE — ED PROVIDER NOTE - NSFOLLOWUPINSTRUCTIONS_ED_ALL_ED_FT
We recommend that you to a clear liquid diet for the first 1 to 2 days then slowly advance to a brat diet and then a regular diet  Follow up with your GI doctor in 5-7 days  Cipro 500mg twice a day for 10 days.  NO RUNNING FOR 2 WEEKS WITH THIS MEDICATION   Flagyl 500mg 3 times a day for 10 days. Do not drink alcohol on this med  Tylenol or Motrin for fever or pain  Return to the ED for worsening pain, vomiting, fevers or any concerns  *********    Colitis    WHAT YOU NEED TO KNOW:    Colitis is swelling and irritation of your colon. Colitis may be caused by ulcers or a problem with your immune system. Bacteria, a virus, or a parasite may also cause colitis. The cause may not be known. You may have diarrhea, abdominal pain, fever, or blood or mucus in your bowel movement.    DISCHARGE INSTRUCTIONS:    Return to the emergency department if:    You have sudden trouble breathing.    Your bowel movements are black or have blood in them.    You have blood in your vomit.    You have severe abdominal pain or your abdomen is swollen and feels hard.    You have any of the following signs of dehydration:  Dizziness or weakness    Dry mouth, cracked lips, or severe thirst    Fast heartbeat or breathing    Urinating very little or not at all  Call your doctor if:    Your symptoms get worse or do not go away.    You have a fever, chills, cough, or feel weak and achy.    You suddenly lose weight without trying.    You have questions or concerns about your condition or care.  Medicines:    Medicines may be given to decrease inflammation in your colon and treat diarrhea.    Take your medicine as directed. Contact your healthcare provider if you think your medicine is not helping or if you have side effects. Tell your provider if you are allergic to any medicine. Keep a list of the medicines, vitamins, and herbs you take. Include the amounts, and when and why you take them. Bring the list or the pill bottles to follow-up visits. Carry your medicine list with you in case of an emergency.  Manage your symptoms:    Drink liquids as directed to help prevent dehydration. Good liquids to drink include water, juice, and broth. Ask how much liquid to drink each day. You may need to drink an oral rehydration solution (ORS). An ORS contains a balance of water, salt, and sugar to replace body fluids lost during diarrhea.    Eat a variety of healthy foods. Healthy foods include fruits, vegetables, whole-grain breads, beans, low-fat dairy products, lean meats, and fish. You may need to eat several small meals throughout the day instead of large meals. Avoid spicy foods, caffeine, chocolate, and foods high in fat.    Talk to your healthcare provider before you take NSAIDs. NSAIDs can cause worsen your symptoms if ulcers are causing your colitis.    Start to exercise when you feel better. Regular exercise helps your bowels work normally. Ask about the best exercise plan for you.  Prevent the spread of germs:      Wash your hands often. Wash your hands several times each day. Wash after you use the bathroom, change a child's diaper, and before you prepare or eat food. Use soap and water every time. Rub your soapy hands together, lacing your fingers. Wash the front and back of your hands, and in between your fingers. Use the fingers of one hand to scrub under the fingernails of the other hand. Wash for at least 20 seconds. Rinse with warm, running water for several seconds. Then dry your hands with a clean towel or paper towel. Use hand  that contains alcohol if soap and water are not available. Do not touch your eyes, nose, or mouth without washing your hands first.  Handwashing      Cover a sneeze or cough. Use a tissue that covers your mouth and nose. Throw the tissue away in a trash can right away. Use the bend of your arm if a tissue is not available. Wash your hands well with soap and water or use a hand .    Clean surfaces often. Clean doorknobs, countertops, cell phones, and other surfaces that are touched often. Use a disinfecting wipe, a single-use sponge, or a cloth you can wash and reuse. Use disinfecting  if you do not have wipes. You can create a disinfecting  by mixing 1 part bleach with 10 parts water.    Ask about vaccines you may need. Vaccines help prevent disease caused by some viruses and bacteria. Get the influenza (flu) vaccine as soon as recommended each year. The flu vaccine is usually available starting in September or October. Flu viruses change, so it is important to get a flu vaccine every year. Get the pneumonia vaccine if recommended. This vaccine is usually recommended every 5 years. Your provider will tell you when to get this vaccine, if needed. Your healthcare provider can tell you if you should get other vaccines, and when to get them.  Follow up with your doctor as directed: You may need to return for a colonoscopy or other tests. Write down how often you have a bowel movements and what they look like. Bring this to your follow-up visits. Write down your questions so you remember to ask them during your visits.  ****************    Food Choices to Help Relieve Diarrhea, Adult  Diarrhea can make you feel weak and cause you to become dehydrated. Dehydration is a condition in which there is not enough water or other fluids in the body. It is important to choose the right foods and drinks to:  Relieve diarrhea.  Replace lost fluids and nutrients.  Prevent dehydration.  What are tips for following this plan?  Relieving diarrhea    Avoid foods that make your diarrhea worse. These may include:  Foods and drinks that are sweetened with high-fructose corn syrup, honey, or sweeteners such as xylitol, sorbitol, and mannitol. Check food labels for these ingredients.  Fried, greasy, or spicy foods.  Raw fruits and vegetables.  Eat foods that are rich in probiotics. These include foods such as yogurt and fermented milk products. Probiotics can help increase healthy bacteria in your stomach and intestines (gastrointestinal or GI tract). This may help digestion and stop diarrhea.  If you have lactose intolerance, avoid dairy products. These may make your diarrhea worse.  Take medicine to help stop diarrhea only as told by your health care provider.  Replacing nutrients    Bananas next to a bowl of applesauce.  Eat bland, easy-to-digest foods in small amounts as you are able, until your diarrhea starts to get better. These foods include bananas, applesauce, rice, toast, and crackers.  Over time, add nutrient-rich foods as your body tolerates them or as told by your health care provider. These include:  Well-cooked protein foods, such as eggs, lean meats like fish or chicken without skin, and tofu.  Peeled, seeded, and soft-cooked fruits and vegetables.  Low-fat dairy products.  Whole grains.  Take vitamin and mineral supplements as told by your health care provider.  Preventing dehydration    A bottle of clear fruit juice and glass of water.  Start by sipping water or a solution to prevent dehydration (oral rehydration solution, or ORS). This is a drink that helps replace fluids and minerals your body has lost. You can buy an ORS at pharmacies and retail stores.  Try to drink at least 8–10 cups (2,000–2,500 mL) of fluid each day to help replace lost fluids. If your urine is pale yellow, you are getting enough fluids.  You may drink other liquids in addition to water, such as fruit juice that you have added water to (diluted fruit juice) or low-calorie sports drinks, as tolerated or as told by your health care provider.  Avoid drinks with caffeine, such as coffee, tea, or soft drinks.  Avoid alcohol.  This information is not intended to replace advice given to you by your health care provider. Make sure you discuss any questions you have with your health care provider.

## 2025-04-01 NOTE — ED PROVIDER NOTE - PROGRESS NOTE DETAILS
GALLO Young:  All results reviewed with patient at bedside.  Labs unremarkable.  UA results unremarkable.  Abdominal CAT scan results show questionable ileus and colonic wall thickening of the transverse descending and rectosigmoid colon.  Patient's pain resolved with Tylenol given in the ED he appears comfortable.  Spoke with his gastroenterologist Dr. Segura regarding the results, he recommends starting Cipro Flagyl for 10 days, clear liquid diet for 1 to 2 days with slow advancement.  He will follow-up with patient in the office in the next 5 to 7 days

## 2025-04-01 NOTE — ED ADULT TRIAGE NOTE - TEMPERATURE IN CELSIUS (DEGREES C)
Antibiotic ointment of choice for three days  Keep stitches in for 10 days  May wash gently, but do NOT soak or submerge. Keep clean and dry with bandage  Return if worse in any way  Follow up with lonny Brush, or Dr. Ina Monroe if you havent one.   If you feel the thumb is not fully strong or mobile, follow up with dr Ro Palomares from [facility]
36.4

## 2025-04-01 NOTE — ED ADULT TRIAGE NOTE - WEIGHT IN KG
98.9 Take aspirin 81 mg every day   Follow up with Dr Jacobsen in the office, ([phone]). Call to schedule an appointment     Follow up with your primary care physician in 48-72 hours- bring copies of your results.  Return to the ER for worsening or persistent symptoms, including but not limited to worsening/persistent pain, shortness of breath, fevers, vomiting, lightheadedness, passing out and/or ANY NEW OR CONCERNING SYMPTOMS. If you have issues obtaining follow up, please call: [phone] to obtain a doctor or specialist who takes your insurance in your area.  You may call [phone] to make an appointment with the internal medicine clinic.
